# Patient Record
Sex: FEMALE | Race: BLACK OR AFRICAN AMERICAN | Employment: OTHER | URBAN - METROPOLITAN AREA
[De-identification: names, ages, dates, MRNs, and addresses within clinical notes are randomized per-mention and may not be internally consistent; named-entity substitution may affect disease eponyms.]

---

## 2020-10-13 ENCOUNTER — TRANSCRIBE ORDER (OUTPATIENT)
Dept: SCHEDULING | Age: 70
End: 2020-10-13

## 2020-10-13 DIAGNOSIS — J98.11 ATELECTASIS, RIGHT: Primary | ICD-10-CM

## 2020-10-21 ENCOUNTER — HOSPITAL ENCOUNTER (OUTPATIENT)
Dept: CT IMAGING | Age: 70
Discharge: HOME | End: 2020-10-21
Attending: INTERNAL MEDICINE
Payer: MEDICARE

## 2020-10-21 DIAGNOSIS — J98.11 ATELECTASIS, RIGHT: ICD-10-CM

## 2020-10-21 PROCEDURE — 71250 CT THORAX DX C-: CPT

## 2021-05-27 ENCOUNTER — HOSPITAL ENCOUNTER (OUTPATIENT)
Age: 71
Setting detail: OUTPATIENT SURGERY
Discharge: HOME | End: 2021-05-27
Attending: SPECIALIST | Admitting: SPECIALIST
Payer: MEDICARE

## 2021-05-27 VITALS
TEMPERATURE: 98.8 F | DIASTOLIC BLOOD PRESSURE: 78 MMHG | HEIGHT: 59 IN | WEIGHT: 164.7 LBS | SYSTOLIC BLOOD PRESSURE: 157 MMHG | BODY MASS INDEX: 33.2 KG/M2 | HEART RATE: 97 BPM | RESPIRATION RATE: 18 BRPM | OXYGEN SATURATION: 97 %

## 2021-05-27 DIAGNOSIS — R07.9 CHEST PAIN, UNSPECIFIED TYPE: ICD-10-CM

## 2021-05-27 LAB
ANION GAP SERPL CALC-SCNC: 7 MMOL/L (ref 5–15)
BUN SERPL-MCNC: 12 MG/DL (ref 6–20)
BUN/CREAT SERPL: 12 (ref 12–20)
CALCIUM SERPL-MCNC: 10.3 MG/DL (ref 8.5–10.1)
CHLORIDE SERPL-SCNC: 100 MMOL/L (ref 97–108)
CO2 SERPL-SCNC: 30 MMOL/L (ref 21–32)
CREAT SERPL-MCNC: 0.97 MG/DL (ref 0.55–1.02)
ERYTHROCYTE [DISTWIDTH] IN BLOOD BY AUTOMATED COUNT: 15.4 % (ref 11.5–14.5)
GLUCOSE SERPL-MCNC: 101 MG/DL (ref 65–100)
HCT VFR BLD AUTO: 34.7 % (ref 35–47)
HGB BLD-MCNC: 11.5 G/DL (ref 11.5–16)
MAGNESIUM SERPL-MCNC: 2 MG/DL (ref 1.6–2.4)
MCH RBC QN AUTO: 28.3 PG (ref 26–34)
MCHC RBC AUTO-ENTMCNC: 33.1 G/DL (ref 30–36.5)
MCV RBC AUTO: 85.3 FL (ref 80–99)
NRBC # BLD: 0.13 K/UL (ref 0–0.01)
NRBC BLD-RTO: 1.5 PER 100 WBC
PLATELET # BLD AUTO: 163 K/UL (ref 150–400)
PMV BLD AUTO: 9.4 FL (ref 8.9–12.9)
POTASSIUM SERPL-SCNC: 3 MMOL/L (ref 3.5–5.1)
RBC # BLD AUTO: 4.07 M/UL (ref 3.8–5.2)
SODIUM SERPL-SCNC: 137 MMOL/L (ref 136–145)
TROPONIN I SERPL-MCNC: <0.05 NG/ML
WBC # BLD AUTO: 8.6 K/UL (ref 3.6–11)

## 2021-05-27 PROCEDURE — C1760 CLOSURE DEV, VASC: HCPCS | Performed by: SPECIALIST

## 2021-05-27 PROCEDURE — 36415 COLL VENOUS BLD VENIPUNCTURE: CPT

## 2021-05-27 PROCEDURE — 84484 ASSAY OF TROPONIN QUANT: CPT

## 2021-05-27 PROCEDURE — 77030004532 HC CATH ANGI DX IMP BSC -A: Performed by: SPECIALIST

## 2021-05-27 PROCEDURE — 77030003390 HC NDL ANGI MRTM -A: Performed by: SPECIALIST

## 2021-05-27 PROCEDURE — 80048 BASIC METABOLIC PNL TOTAL CA: CPT

## 2021-05-27 PROCEDURE — 74011250636 HC RX REV CODE- 250/636: Performed by: SPECIALIST

## 2021-05-27 PROCEDURE — 83735 ASSAY OF MAGNESIUM: CPT

## 2021-05-27 PROCEDURE — 74011000636 HC RX REV CODE- 636: Performed by: SPECIALIST

## 2021-05-27 PROCEDURE — 85027 COMPLETE CBC AUTOMATED: CPT

## 2021-05-27 PROCEDURE — 74011000250 HC RX REV CODE- 250: Performed by: SPECIALIST

## 2021-05-27 PROCEDURE — 74011250637 HC RX REV CODE- 250/637: Performed by: SPECIALIST

## 2021-05-27 PROCEDURE — 99152 MOD SED SAME PHYS/QHP 5/>YRS: CPT | Performed by: SPECIALIST

## 2021-05-27 PROCEDURE — C1894 INTRO/SHEATH, NON-LASER: HCPCS | Performed by: SPECIALIST

## 2021-05-27 PROCEDURE — 93458 L HRT ARTERY/VENTRICLE ANGIO: CPT | Performed by: SPECIALIST

## 2021-05-27 RX ORDER — FENTANYL CITRATE 50 UG/ML
INJECTION, SOLUTION INTRAMUSCULAR; INTRAVENOUS AS NEEDED
Status: DISCONTINUED | OUTPATIENT
Start: 2021-05-27 | End: 2021-05-27 | Stop reason: HOSPADM

## 2021-05-27 RX ORDER — DIPHENHYDRAMINE HYDROCHLORIDE 50 MG/ML
25 INJECTION, SOLUTION INTRAMUSCULAR; INTRAVENOUS
Status: DISCONTINUED | OUTPATIENT
Start: 2021-05-27 | End: 2021-05-28 | Stop reason: HOSPADM

## 2021-05-27 RX ORDER — CETIRIZINE HCL 10 MG
10 TABLET ORAL DAILY
COMMUNITY

## 2021-05-27 RX ORDER — SODIUM CHLORIDE 0.9 % (FLUSH) 0.9 %
5-40 SYRINGE (ML) INJECTION AS NEEDED
Status: DISCONTINUED | OUTPATIENT
Start: 2021-05-27 | End: 2021-05-28 | Stop reason: HOSPADM

## 2021-05-27 RX ORDER — HEPARIN SODIUM 200 [USP'U]/100ML
INJECTION, SOLUTION INTRAVENOUS
Status: COMPLETED | OUTPATIENT
Start: 2021-05-27 | End: 2021-05-27

## 2021-05-27 RX ORDER — POTASSIUM CHLORIDE 750 MG/1
40 TABLET, FILM COATED, EXTENDED RELEASE ORAL
Status: COMPLETED | OUTPATIENT
Start: 2021-05-27 | End: 2021-05-27

## 2021-05-27 RX ORDER — MELATONIN
1000 DAILY
COMMUNITY

## 2021-05-27 RX ORDER — ONDANSETRON 2 MG/ML
INJECTION INTRAMUSCULAR; INTRAVENOUS
Status: DISCONTINUED
Start: 2021-05-27 | End: 2021-05-28 | Stop reason: HOSPADM

## 2021-05-27 RX ORDER — ALBUTEROL SULFATE 90 UG/1
2 AEROSOL, METERED RESPIRATORY (INHALATION)
COMMUNITY

## 2021-05-27 RX ORDER — BISMUTH SUBSALICYLATE 262 MG
1 TABLET,CHEWABLE ORAL DAILY
COMMUNITY

## 2021-05-27 RX ORDER — SODIUM CHLORIDE 0.9 % (FLUSH) 0.9 %
5-40 SYRINGE (ML) INJECTION EVERY 8 HOURS
Status: DISCONTINUED | OUTPATIENT
Start: 2021-05-27 | End: 2021-05-28 | Stop reason: HOSPADM

## 2021-05-27 RX ORDER — SODIUM CHLORIDE 9 MG/ML
75 INJECTION, SOLUTION INTRAVENOUS CONTINUOUS
Status: DISCONTINUED | OUTPATIENT
Start: 2021-05-27 | End: 2021-05-27

## 2021-05-27 RX ORDER — MIDAZOLAM HYDROCHLORIDE 1 MG/ML
INJECTION, SOLUTION INTRAMUSCULAR; INTRAVENOUS AS NEEDED
Status: DISCONTINUED | OUTPATIENT
Start: 2021-05-27 | End: 2021-05-27 | Stop reason: HOSPADM

## 2021-05-27 RX ORDER — LANOLIN ALCOHOL/MO/W.PET/CERES
CREAM (GRAM) TOPICAL
COMMUNITY

## 2021-05-27 RX ORDER — ONDANSETRON 2 MG/ML
4 INJECTION INTRAMUSCULAR; INTRAVENOUS ONCE
Status: COMPLETED | OUTPATIENT
Start: 2021-05-27 | End: 2021-05-27

## 2021-05-27 RX ORDER — ROSUVASTATIN CALCIUM 10 MG/1
10 TABLET, COATED ORAL
COMMUNITY

## 2021-05-27 RX ORDER — SODIUM CHLORIDE 9 MG/ML
125 INJECTION, SOLUTION INTRAVENOUS CONTINUOUS
Status: DISCONTINUED | OUTPATIENT
Start: 2021-05-27 | End: 2021-05-27 | Stop reason: HOSPADM

## 2021-05-27 RX ORDER — GLIPIZIDE 10 MG/1
10 TABLET ORAL 2 TIMES DAILY
COMMUNITY

## 2021-05-27 RX ORDER — LIDOCAINE HYDROCHLORIDE 10 MG/ML
INJECTION INFILTRATION; PERINEURAL AS NEEDED
Status: DISCONTINUED | OUTPATIENT
Start: 2021-05-27 | End: 2021-05-27 | Stop reason: HOSPADM

## 2021-05-27 RX ORDER — HYDROCORTISONE SODIUM SUCCINATE 100 MG/2ML
100 INJECTION, POWDER, FOR SOLUTION INTRAMUSCULAR; INTRAVENOUS
Status: DISCONTINUED | OUTPATIENT
Start: 2021-05-27 | End: 2021-05-28 | Stop reason: HOSPADM

## 2021-05-27 RX ORDER — HYDROCHLOROTHIAZIDE 25 MG/1
25 TABLET ORAL DAILY
COMMUNITY

## 2021-05-27 RX ORDER — NEBIVOLOL 5 MG/1
5 TABLET ORAL DAILY
COMMUNITY

## 2021-05-27 RX ORDER — AMLODIPINE BESYLATE 10 MG/1
10 TABLET ORAL DAILY
COMMUNITY

## 2021-05-27 RX ADMIN — ONDANSETRON 4 MG: 2 INJECTION INTRAMUSCULAR; INTRAVENOUS at 14:07

## 2021-05-27 RX ADMIN — POTASSIUM CHLORIDE 40 MEQ: 750 TABLET, EXTENDED RELEASE ORAL at 13:28

## 2021-05-27 NOTE — PROGRESS NOTES
12:15 PM    Patient arrived. ID and allergies verified verbally with patient. Pt voices understanding of procedure to be performed. Consent obtained. Pt prepped for procedure. 1:19 PM    Patient lab potassium 3.0 Dr Gabi Clemente notified. Telephone order given for PO potassium 40 MEQs    2:05 PM    TRANSFER - IN REPORT:    Verbal report received from [employer] (name) on [address]  being received from Cath Lab (unit) for routine post - op      Report consisted of patients Situation, Background, Assessment and   Recommendations(SBAR). Information from the following report(s) Procedure Summary was reviewed with the receiving nurse. Opportunity for questions and clarification was provided. Assessment completed upon patients arrival to unit and care assumed. 3:35 PM    Discharge instructions reviewed with patient and family. Voiced understanding. Patient given copy of discharge instructions to take home. 4:00 PM    Pt sat on side of bed then ambulated around unit and to restroom. Voided. Returned to chair. R Groin site dressing dry and intact. No bleeding or hematoma. Pt voices no complaints. 4:15 PM    Pt discharged via wheelchair with family. Personal belongings with patient upon discharge.

## 2021-05-27 NOTE — PROCEDURES
Cath:  Normal cors. Normal LVF (EF 70%).   No AVG/MR.  RFA angioseal    F/U with Dr. Betty Garcia 6/10/21 @ 9:45am.

## 2021-05-27 NOTE — Clinical Note
TRANSFER - IN REPORT:     Verbal report received from: January CLAY. Report consisted of patient's Situation, Background, Assessment and   Recommendations(SBAR). Opportunity for questions and clarification was provided. Assessment completed upon patient's arrival to unit and care assumed. Patient transported with a Registered Nurse.

## 2021-05-27 NOTE — H&P
Date of Surgery Update:  Edwar Quarles was seen and examined. History and physical has been reviewed. The patient has been examined. There have been no significant clinical changes since the completion of the originally dated History and Physical.    Signed By: Korin Goncalves MD     May 27, 2021 12:34 PM       Exertional CP  EBT (3/23/15) = 0. Celena Adamty 1950   Office/Outpatient Visit  Visit Date: Thu, May 27, 2021 9:30 am  Provider: Deon Hernandez MD (Assistant: Keren Bai RN )  Location: [facility] AT [facility] ([facility] [address] [place] [address] Tierra Barr. [id number] [phone]    Electronically signed by Suresh Bradley MD on  05/27/2021 09:54:28 AM                           Subjective:    CC: Ms. Mae Mejia is a 80year old [de-identified] or  female. Her primary care physician is Etienne Hayes. Kenrick Pablo MD.  New Patient- Not seen in 6 years She presents today with a complaint of chest pain and shortness of breath. She has a history of mixed hyperlipidemia and essential hypertension. HPI:           Hypercholesterolemia: progressive angina and sob with walking for two weeks, abn ekg, risk factors, cath today due to usaCurrent treatment includes Crestor. Regarding hypertension:  Type Primary Hypertension Currently, her treatment regimen consists of bystolic,  Norvasc, and a diuretic ( hydrochlorothiazide ). Regarding dyspnea/shortness of breath: This tends to be worse with exertion. The shortness of breath is better rest.        Concerning chest pain, unspecified, the discomfort is located primarily in the center of the chest.  Typically, individual episodes of chest pain last only a few seconds. She characterizes the pain as sharp. Pain improves with rest.  When walking, she gets sob, then discomfort in chest follows. Symptoms appeared a couple of weeks ago. Subsides after rest. Most recent episode was yesterday shopping. Also complains of swelling in feet. PCP prescribed fluid pill, which has slightly improved swelling. Pt has not been exercising lately due to [place] pandemic. She does not note any family history of heart troubles. She says the breast cancer is stable. EKG a little abnormal.Medications reviewed, BP slightly elevated. Discussing possibility of cardiac cath. ROS:   Discussed relevant lab and imaging studies along with the plan of treatment with the nurse. EYES:  Negative for blurred vision and eye pain. E/N/T:  Negative for epistaxis and hoarseness. CARDIOVASCULAR:  Please see HPI. RESPIRATORY:  Negative for cough and hemoptysis. GASTROINTESTINAL:  Negative for abdominal pain and dysphagia. MUSCULOSKELETAL:  Negative for arthralgias and back pain. NEUROLOGICAL:  Negative for headaches, paresthesias, and weakness. HEMATOLOGIC/LYMPHATIC:  Negative for easy bruising, bleeding, and lymphadenopathy. PSYCHIATRIC:  No symptoms reported. Past Medical History / Family History / Social History:     Last Reviewed on 5/27/2021 09:36 AM by Estelle Armenta  Past Medical History:     LVEF: 60% (on 7/6/2007)  Hyperlipidemia  Hypertension   Asthma: dx'd at age 49;   Chronic Bronchitis   Gastroesophageal Reflux Disease: since 2009;   Type 2 Diabetes: dx'd in 2014;     GYNECOLOGICAL HISTORY:  Last mammogram was 11/17/2011; (+) abnormal mammogram: in 11/17/2011; biopsy showed 6 mm infiltrating ductal carcinoma   INFLUENZA VACCINE: was last done 10/2020   COVID-19 Vaccine: was last done 3/2021 The next one is due  4/2021 Alexis Gomes     Past Cardiac Procedures:  Stress Echo: 7/6/2007- Negative for ischemia. Mild MR, TR. EF 60%. Stress Echo:  2/13/15 - 5 mins 59 secs. No ischemia on ECG/Echo. Normal pre & post global wall motion. No chest discomfort. Normal HR response to exercise. Hypertensive response to exercise. Fair exercise capacity. Irby score is 6. EBT 3/23/15 - Calcium score zero. Middle lobe collapse. No identrified calcified plaque. Surgical History:   Surgical/Procedural History:   Hysterectomy: ; Partial   Colonoscopy (  )  2011-Left mastectomy/sentinel lymph node excision     Family History:   Father: Medical history unknown;   at age 46s   Mother:  at age 68  ; Positive for Esophageal Cancer;   Brother(s): Healthy; 1 brother(s) total     Social History:   Social History:   Occupation: Retired (Prior occupation: )   Marital Status:    Children: 2 children (ages 29, 27 )   Ms. Quarles denies any current form of exercise. Tobacco/Alcohol/Supplements:   Last Reviewed on 2021 09:36 AM by Shae Chong  TOBACCO/ALCOHOL/SUPPLEMENTS   Tobacco: She has never smoked. Alcohol: Non-drinker   Caffeine:  She admits to consuming caffeine via coffee ( 1 serving per day ) and tea ( 1 serving per day ). Substance Abuse History:   Last Reviewed on 2021 09:36 AM by Shae Chong  Substance Use/Abuse:   None     Mental Health History:   Last Reviewed on 2021 09:36 AM by Shae Chong  Mental Health History: NEGATIVE     Communicable Diseases (eg STDs):    Last Reviewed on 2021 09:36 AM by Shae Chong    Allergies:   Last Reviewed on 2021 09:36 AM by Shae Chong  Aspirin (ASA): abdominal pain   Elidel: Rash   Carvedilol:      Current Medications:   Last Reviewed on 2021 09:36 AM by Shae Chong  Bystolic 5 mg oral tablet [1 po qd]  multivitamin oral tablet [1 po qd]  Pepcid Complete -165 mg oral tablet,chewable [take 1 tab qd]  ProAir HFA 90 mcg/actuation Inhalation HFA Aerosol Inhaler [2 puffs 4 times daily PRN]  Vitamin D3 25 mcg (1,000 unit) oral tablet [1 po daily]  Zyrtec 10 mg oral tablet [prn as directed for allergies]  Amlodipine  10mg Tablet [1 po qd]  hydroCHLOROthiazide 25 mg oral tablet [take 1 tablet (25 mg) daily]  rosuvastatin 10 mg oral tablet [take 1 tablet (10 mg) by oral route once daily]  glipiZIDE 10 mg oral tablet [take 1 tablet (10 mg) daily]  iron   Fideline     Objective:    Vitals:     Historical:   3/11/2015  BP:   152/88 mm Hg ( (left arm, , standing, );) 3/11/2015  BP:   157/79 mm Hg ( (right arm, , sitting, );) 3/11/2015  Wt:   167lbs  Current: 5/27/2021 9:35:29 AM  Ht:  4 ft, 11 in; Wt: 166 lbs;  BMI: 33. 5BP: 168/90 mm Hg (left arm, sitting);  P: 113 bpm (left arm (BP Cuff), sitting);  sCr: 0.87 mg/dL;  GFR: 59.08    Exams:   GENERAL:  Alert, oriented to person, place and time x3. EYES:  Normal lids without xanthelasma; conjunctiva unremarkable; no scleral icterus. HEENT:  Face symmetric, voice clear, extraocular muscles intact. NECK:  Supple. No bruits, No JVD. LUNGS:  Clear to auscultation. No rales, no wheezing. CARDIAC:  Regular rate and rhythm. PMI not displaced. ABDOMEN:  Soft. Positive bowel sounds. Non-tender. MUSCULOSKELETAL:  Normal range of motion, strength and tone. EXTREMITIES:  No edema. Good muscle tone and strength. SKIN: No significant rashes or lesions; no suspicious moles. NEUROLOGICAL:  No focal deficits, cranial nerves II-XII are grossly intact. PSYCHIATRIC:  Appropriate affect and demeanor; normal speech pattern; grossly normal memory. Procedures:   Essential (primary) hypertension    ECG INTERPRETATION: See scanned EKG for results. Assessment:     E78.2   Mixed hyperlipidemia     I10   Essential (primary) hypertension     R06.02   Shortness of breath     E78.00   Pure hypercholesterolemia     I10   Essential (primary) hypertension     R07.9   Chest pain, unspecified       ORDERS:     Procedures Ordered:     25514  Education and train for pt self-mgmt by qualified, nonphysician, ea 30 minutes; individual pt  (Send-Out)          39460  Electrocardiogram, routine with at least 12 leads; with interpretation and report  (In-House)          XCATH  Cardiac Cath  (In-House)              Plan:     Essential (primary) hypertension      Medication list has been reviewed. Continue current medications. Testing/Procedures:   Cardiac Catheterization -   Explained to the patient the indication, procedure, risks, and benefits of cardiac catheterization. The patient understands and wishes to proceed with the cath to be performed today at [facility] for evaluation of chest pain, shortness of breath. Will obtain labs from PCP for review. Discussed with patient diet, exercise plan and lifestyle modifications. The above note was transcribed by Torrey Saab and authenticated by Dr. Mahamed Zavala prior to sign off.

## 2021-05-27 NOTE — DISCHARGE INSTRUCTIONS
Discharge Instructions:                      Cardiac Catheterization/Angiography Discharge Instructions    *Check the puncture site frequently for swelling or bleeding. If you see any bleeding, lie down and apply pressure over the area and call 911. Notify your doctor for any redness, swelling, drainage or oozing from the puncture site. Notify your doctor for any fever or chills. *If the leg or arm with the puncture becomes cold, numb or painful, call Dr Rabia Quiles    *Activity should be limited for the next 48 hours. Climb stairs as little as possible and avoid any stooping, bending or strenuous activity for 48 hours. No heavy lifting (anything over 10 pounds) for three days. *Do not drive for 24 hours. *You may resume your usual diet. Drink more fluids than usual.    *Have a responsible person drive you home and stay with you for at least 24 hours after your heart catheterization/angiography. *You may remove the bandage from your groin in 24 hours. You may shower in 24 hours. No tub baths, hot tubs or swimming for one week. Do not place any lotions, creams, powders, ointments over the puncture site for one week. You may place a clean band-aid over the puncture site each day for 5 days. Change this daily. Medications:     NO CHANGES    Activity:     As tolerated except do not lift over 10 pounds for 5 days. Diet:     American Heart Association. Follow-up:     Follow up with Gaylen Najjar, MD on June 10th, 2021 at 9:45am.  [address]  [phone]      If you smoke, STOP!

## 2021-05-27 NOTE — Clinical Note
TRANSFER - OUT REPORT:     Verbal report given to: (at bedside). Report consisted of patient's Situation, Background, Assessment and   Recommendations(SBAR). Opportunity for questions and clarification was provided. Patient transported with a Registered Nurse and [address] [place] [address] / [facility] [employer]. Patient transported to: recovery.

## 2021-09-22 ENCOUNTER — TRANSCRIBE ORDER (OUTPATIENT)
Dept: SCHEDULING | Age: 71
End: 2021-09-22

## 2021-09-22 DIAGNOSIS — R91.8 ABNORMAL CT SCAN OF LUNG: Primary | ICD-10-CM

## 2021-09-29 ENCOUNTER — HOSPITAL ENCOUNTER (OUTPATIENT)
Dept: CT IMAGING | Age: 71
Discharge: HOME | End: 2021-09-29
Attending: NURSE PRACTITIONER
Payer: MEDICARE

## 2021-09-29 DIAGNOSIS — R91.8 ABNORMAL CT SCAN OF LUNG: ICD-10-CM

## 2021-09-29 PROCEDURE — 71250 CT THORAX DX C-: CPT

## 2021-12-01 ENCOUNTER — TRANSCRIBE ORDER (OUTPATIENT)
Dept: SCHEDULING | Age: 71
End: 2021-12-01

## 2021-12-01 DIAGNOSIS — R49.0 DYSPHONIA: ICD-10-CM

## 2021-12-01 DIAGNOSIS — J38.00 VOCAL CORD PARALYSIS: ICD-10-CM

## 2021-12-01 DIAGNOSIS — R22.1 NECK MASS: ICD-10-CM

## 2021-12-01 DIAGNOSIS — R49.0 HOARSENESS: Primary | ICD-10-CM

## 2021-12-27 ENCOUNTER — HOSPITAL ENCOUNTER (OUTPATIENT)
Dept: CT IMAGING | Age: 71
Discharge: HOME | End: 2021-12-27
Attending: SPECIALIST
Payer: MEDICARE

## 2021-12-27 DIAGNOSIS — R49.0 HOARSENESS: ICD-10-CM

## 2021-12-27 DIAGNOSIS — R49.0 DYSPHONIA: ICD-10-CM

## 2021-12-27 DIAGNOSIS — R22.1 NECK MASS: ICD-10-CM

## 2021-12-27 DIAGNOSIS — J38.00 VOCAL CORD PARALYSIS: ICD-10-CM

## 2021-12-27 LAB — CREAT BLD-MCNC: 0.6 MG/DL (ref 0.6–1.3)

## 2021-12-27 PROCEDURE — 82565 ASSAY OF CREATININE: CPT

## 2021-12-27 PROCEDURE — 70491 CT SOFT TISSUE NECK W/DYE: CPT

## 2021-12-27 PROCEDURE — 74011000636 HC RX REV CODE- 636: Performed by: SPECIALIST

## 2021-12-27 RX ADMIN — IOPAMIDOL 100 ML: 612 INJECTION, SOLUTION INTRAVENOUS at 08:30

## (undated) DEVICE — PROCEDURE KIT FLUID MGMT CUST MAINFOLD STRL

## (undated) DEVICE — NEEDLE ANGIO 18GA L9CM NRML 1 WALL SMOOTH FINISH CLR HUB FOR

## (undated) DEVICE — CATH DIAG-D 6F MULTI PIG 155 5 -- IMPULSE 16599-302

## (undated) DEVICE — PACK PROCEDURE SURG HRT CATH

## (undated) DEVICE — PINNACLE INTRODUCER SHEATH: Brand: PINNACLE

## (undated) DEVICE — ANGIO-SEAL VIP VASCULAR CLOSURE DEVICE: Brand: ANGIO-SEAL